# Patient Record
Sex: MALE | URBAN - METROPOLITAN AREA
[De-identification: names, ages, dates, MRNs, and addresses within clinical notes are randomized per-mention and may not be internally consistent; named-entity substitution may affect disease eponyms.]

---

## 2023-01-13 ENCOUNTER — WEB ENCOUNTER (OUTPATIENT)
Dept: URBAN - METROPOLITAN AREA CLINIC 9 | Facility: CLINIC | Age: 58
End: 2023-01-13

## 2023-01-19 ENCOUNTER — OFFICE VISIT (OUTPATIENT)
Dept: URBAN - METROPOLITAN AREA CLINIC 9 | Facility: CLINIC | Age: 58
End: 2023-01-19
Payer: COMMERCIAL

## 2023-01-19 VITALS
BODY MASS INDEX: 30.21 KG/M2 | SYSTOLIC BLOOD PRESSURE: 118 MMHG | WEIGHT: 211 LBS | DIASTOLIC BLOOD PRESSURE: 72 MMHG | HEIGHT: 70 IN

## 2023-01-19 DIAGNOSIS — R14.0 BLOATING: ICD-10-CM

## 2023-01-19 DIAGNOSIS — R10.30 LOWER ABDOMINAL PAIN: ICD-10-CM

## 2023-01-19 DIAGNOSIS — K92.1 BLOOD IN STOOL: ICD-10-CM

## 2023-01-19 PROCEDURE — 99243 OFF/OP CNSLTJ NEW/EST LOW 30: CPT | Performed by: STUDENT IN AN ORGANIZED HEALTH CARE EDUCATION/TRAINING PROGRAM

## 2023-01-19 PROCEDURE — 99203 OFFICE O/P NEW LOW 30 MIN: CPT | Performed by: STUDENT IN AN ORGANIZED HEALTH CARE EDUCATION/TRAINING PROGRAM

## 2023-01-19 RX ORDER — HYDROCHLOROTHIAZIDE 12.5 MG/1
1 CAPSULE IN THE MORNING CAPSULE, GELATIN COATED ORAL ONCE A DAY
Qty: 30 | Status: ACTIVE | COMMUNITY
Start: 2023-01-19

## 2023-01-19 RX ORDER — METOPROLOL TARTRATE 25 MG/1
1 TABLET WITH FOOD TABLET, FILM COATED ORAL TWICE A DAY
Qty: 60 | Status: ACTIVE | COMMUNITY
Start: 2023-01-19

## 2023-01-19 NOTE — HPI-TODAY'S VISIT:
Patient has a history of HTN,  who presents for blood in stool (+FOBT)  GI Hx: Intermittent blood in stool over the past year but more prominent in the last 1-2 months. Mainly in toilet bowel and when wiping. Also has associated cramping abdominal pain, started 1.5 weeks ago.  Has 1-2 BM/day, has to strain.  Denies weight loss, fever, chills, nausea, vomiting, diarrhea.  Denies dysphagia, reflux, UGI symptoms. Denies hematemesis, melena,.  No family hx of colon cancer/polyps.  EGD: None  Colonoscopy: 4-5 years ago- [facility]- no polyps.  Imaging/Studies/Procedures:

## 2023-01-25 ENCOUNTER — CLAIMS CREATED FROM THE CLAIM WINDOW (OUTPATIENT)
Dept: URBAN - METROPOLITAN AREA CLINIC 4 | Facility: CLINIC | Age: 58
End: 2023-01-25
Payer: COMMERCIAL

## 2023-01-25 ENCOUNTER — CLAIMS CREATED FROM THE CLAIM WINDOW (OUTPATIENT)
Dept: URBAN - METROPOLITAN AREA SURGERY CENTER 5 | Facility: SURGERY CENTER | Age: 58
End: 2023-01-25
Payer: COMMERCIAL

## 2023-01-25 DIAGNOSIS — K57.30 ACQUIRED DIVERTICULOSIS OF COLON: ICD-10-CM

## 2023-01-25 DIAGNOSIS — K64.0 GRADE I HEMORRHOIDS: ICD-10-CM

## 2023-01-25 DIAGNOSIS — D12.2 BENIGN NEOPLASM OF ASCENDING COLON: ICD-10-CM

## 2023-01-25 DIAGNOSIS — K63.89 OTHER SPECIFIED DISEASES OF INTESTINE: ICD-10-CM

## 2023-01-25 DIAGNOSIS — D12.4 BENIGN NEOPLASM OF DESCENDING COLON: ICD-10-CM

## 2023-01-25 DIAGNOSIS — K63.5 BENIGN COLON POLYP: ICD-10-CM

## 2023-01-25 DIAGNOSIS — R19.5 ABNORMAL CONSISTENCY OF STOOL: ICD-10-CM

## 2023-01-25 PROCEDURE — 45385 COLONOSCOPY W/LESION REMOVAL: CPT | Performed by: STUDENT IN AN ORGANIZED HEALTH CARE EDUCATION/TRAINING PROGRAM

## 2023-01-25 PROCEDURE — 88305 TISSUE EXAM BY PATHOLOGIST: CPT | Performed by: PATHOLOGY

## 2023-01-25 PROCEDURE — 88341 IMHCHEM/IMCYTCHM EA ADD ANTB: CPT | Performed by: PATHOLOGY

## 2023-01-25 PROCEDURE — 88342 IMHCHEM/IMCYTCHM 1ST ANTB: CPT | Performed by: PATHOLOGY

## 2023-01-25 RX ORDER — METOPROLOL TARTRATE 25 MG/1
1 TABLET WITH FOOD TABLET, FILM COATED ORAL TWICE A DAY
Qty: 60 | Status: ACTIVE | COMMUNITY
Start: 2023-01-19

## 2023-01-25 RX ORDER — HYDROCORTISONE ACETATE 25 MG/1
1 SUPPOSITORY SUPPOSITORY RECTAL ONCE A DAY
Qty: 14 | Refills: 0 | OUTPATIENT
Start: 2023-01-25 | End: 2023-02-08

## 2023-01-25 RX ORDER — HYDROCHLOROTHIAZIDE 12.5 MG/1
1 CAPSULE IN THE MORNING CAPSULE, GELATIN COATED ORAL ONCE A DAY
Qty: 30 | Status: ACTIVE | COMMUNITY
Start: 2023-01-19

## 2023-02-20 ENCOUNTER — DASHBOARD ENCOUNTERS (OUTPATIENT)
Age: 58
End: 2023-02-20

## 2023-02-20 ENCOUNTER — OFFICE VISIT (OUTPATIENT)
Dept: URBAN - METROPOLITAN AREA CLINIC 7 | Facility: CLINIC | Age: 58
End: 2023-02-20
Payer: COMMERCIAL

## 2023-02-20 VITALS
BODY MASS INDEX: 29.49 KG/M2 | WEIGHT: 206 LBS | HEIGHT: 70 IN | SYSTOLIC BLOOD PRESSURE: 126 MMHG | DIASTOLIC BLOOD PRESSURE: 78 MMHG | TEMPERATURE: 98 F

## 2023-02-20 DIAGNOSIS — K64.0 GRADE I HEMORRHOIDS: ICD-10-CM

## 2023-02-20 DIAGNOSIS — K92.1 BLOOD IN STOOL: ICD-10-CM

## 2023-02-20 DIAGNOSIS — R14.0 BLOATING: ICD-10-CM

## 2023-02-20 DIAGNOSIS — K63.5 POLYP OF COLON, UNSPECIFIED PART OF COLON, UNSPECIFIED TYPE: ICD-10-CM

## 2023-02-20 PROCEDURE — 99214 OFFICE O/P EST MOD 30 MIN: CPT | Performed by: STUDENT IN AN ORGANIZED HEALTH CARE EDUCATION/TRAINING PROGRAM

## 2023-02-20 RX ORDER — HYDROCHLOROTHIAZIDE 12.5 MG/1
1 CAPSULE IN THE MORNING CAPSULE, GELATIN COATED ORAL ONCE A DAY
Qty: 30 | Status: ACTIVE | COMMUNITY
Start: 2023-01-19

## 2023-02-20 RX ORDER — METOPROLOL TARTRATE 25 MG/1
1 TABLET WITH FOOD TABLET, FILM COATED ORAL TWICE A DAY
Qty: 60 | Status: ACTIVE | COMMUNITY
Start: 2023-01-19

## 2023-02-20 NOTE — HPI-TODAY'S VISIT:
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  GI Hx: 1/2023- Intermittent blood in stool over the past year but more prominent in the last 1-2 months. Mainly in toilet bowel and when wiping. Also has associated cramping abdominal pain, started 1.5 weeks ago.  Has 1-2 BM/day, has to strain.  2/20/23- Discussed biopsy results including uncommon polyp seen. Took extensive family history regarding skin, NF1, MEN2B, thyroid other family cancers. No relevant family hx. Discussed case series/case reports: https://www.ncbi.nlm.nih.gov/pmc/articles/RRL6097944/ Currently feels well. Denies weight loss, fever, chills, nausea, vomiting, diarrhea.  Denies dysphagia, reflux, UGI symptoms. Denies hematemesis, melena.  No family hx of colon cancer/polyps.  Mother- ovarian cancer Brother- NHL   EGD: None  Colonoscopy: 4-5 years ago- [facility]- no polyps. 1/25/23- hemorrhoids, TI normal. 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  Imaging/Studies/Procedures: